# Patient Record
Sex: FEMALE | Race: WHITE | NOT HISPANIC OR LATINO | URBAN - METROPOLITAN AREA
[De-identification: names, ages, dates, MRNs, and addresses within clinical notes are randomized per-mention and may not be internally consistent; named-entity substitution may affect disease eponyms.]

---

## 2021-01-07 ENCOUNTER — OFFICE VISIT (OUTPATIENT)
Dept: URBAN - METROPOLITAN AREA CLINIC 118 | Facility: CLINIC | Age: 76
End: 2021-01-07
Payer: MEDICARE

## 2021-01-07 DIAGNOSIS — D63.8 ANEMIA OF CHRONIC DISEASE: ICD-10-CM

## 2021-01-07 DIAGNOSIS — R10.13 EPIGASTRIC DISCOMFORT: ICD-10-CM

## 2021-01-07 DIAGNOSIS — Z86.39 HISTORY OF IRON DEFICIENCY: ICD-10-CM

## 2021-01-07 PROCEDURE — 1036F TOBACCO NON-USER: CPT | Performed by: INTERNAL MEDICINE

## 2021-01-07 PROCEDURE — G8482 FLU IMMUNIZE ORDER/ADMIN: HCPCS | Performed by: INTERNAL MEDICINE

## 2021-01-07 PROCEDURE — G9903 PT SCRN TBCO ID AS NON USER: HCPCS | Performed by: INTERNAL MEDICINE

## 2021-01-07 PROCEDURE — 3017F COLORECTAL CA SCREEN DOC REV: CPT | Performed by: INTERNAL MEDICINE

## 2021-01-07 PROCEDURE — G8427 DOCREV CUR MEDS BY ELIG CLIN: HCPCS | Performed by: INTERNAL MEDICINE

## 2021-01-07 PROCEDURE — G8417 CALC BMI ABV UP PARAM F/U: HCPCS | Performed by: INTERNAL MEDICINE

## 2021-01-07 PROCEDURE — 99214 OFFICE O/P EST MOD 30 MIN: CPT | Performed by: INTERNAL MEDICINE

## 2021-01-07 RX ORDER — TORSEMIDE 20 MG/1
AS DIRECTED TABLET ORAL
Status: ACTIVE | COMMUNITY

## 2021-01-07 RX ORDER — HYDROXYUREA 500 MG/1
CAPSULE ORAL
Qty: 0 | Refills: 0 | Status: ACTIVE | COMMUNITY
Start: 1900-01-01

## 2021-01-07 RX ORDER — HYDRALAZINE HYDROCHLORIDE 50 MG/1
1 TABLET WITH FOOD TABLET, FILM COATED ORAL THREE TIMES A DAY
Status: ACTIVE | COMMUNITY

## 2021-01-07 RX ORDER — TRIAMTERENE AND HYDROCHLOROTHIAZIDE 75; 50 MG/1; MG/1
1 TABLET IN THE MORNING TABLET ORAL ONCE A DAY
Status: ACTIVE | COMMUNITY

## 2021-01-07 RX ORDER — MULTIVITAMIN WITH IRON
AS DIRECTED TABLET ORAL
Status: ACTIVE | COMMUNITY

## 2021-01-07 NOTE — HPI-TODAY'S VISIT:
The patient was last seen about a year ago for anemia.  She has a history of iron deficiency anemia, but her iron levels were normal at her hematologist in October.  However she continues to have severe anemia, possibly due to progression of her chronic kidney disease versus her underlying bone marrow disorder.  She has not been using iron pills and is tolerating aspirin 81 mg daily without any evidence of rectal bleeding.  She is on no new medications except for atorvastatin and Maxide.  Of note she has underlying CHF, and has lost about 20 pounds of fluid since starting the Maxide 2 weeks ago.  However starting at the same time she developed nausea and anorexia, and is unable to tolerate more than a minimal amount of food at any meal.  She thinks she is lost muscle mass as well as the fluid, but her breathing and chest pain are improved.

## 2021-01-08 PROBLEM — 427762006: Status: ACTIVE | Noted: 2021-01-07

## 2021-01-08 PROBLEM — 161449003: Status: ACTIVE | Noted: 2021-01-07

## 2021-01-08 LAB
BASO (ABSOLUTE): 0.1
BASOS: 1
BUN/CREATININE RATIO: 24
BUN: 78
CALCIUM: 9.4
CARBON DIOXIDE, TOTAL: 22
CHLORIDE: 98
CREATININE: 3.28
EGFR IF AFRICN AM: 15
EGFR IF NONAFRICN AM: 13
EOS (ABSOLUTE): 0.1
EOS: 1
GLUCOSE: 118
HEMATOCRIT: 27.7
HEMATOLOGY COMMENTS:: (no result)
HEMOGLOBIN: 9.3
IMMATURE CELLS: (no result)
IMMATURE GRANS (ABS): 0
IMMATURE GRANULOCYTES: 1
LYMPHS (ABSOLUTE): 0.4
LYMPHS: 5
MCH: 36.6
MCHC: 33.6
MCV: 109
MONOCYTES(ABSOLUTE): 0.8
MONOCYTES: 10
NEUTROPHILS (ABSOLUTE): 6.2
NEUTROPHILS: 82
NRBC: (no result)
PLATELETS: 508
POTASSIUM: 5.3
RBC: 2.54
RDW: 12.5
SODIUM: 135
WBC: 7.5

## 2021-07-07 ENCOUNTER — OFFICE VISIT (OUTPATIENT)
Dept: URBAN - METROPOLITAN AREA CLINIC 118 | Facility: CLINIC | Age: 76
End: 2021-07-07
Payer: MEDICARE

## 2021-07-07 DIAGNOSIS — R19.7 DIARRHEA OF PRESUMED INFECTIOUS ORIGIN: ICD-10-CM

## 2021-07-07 DIAGNOSIS — D63.8 ANEMIA OF CHRONIC DISEASE: ICD-10-CM

## 2021-07-07 DIAGNOSIS — R19.4 CHANGE IN BOWEL HABITS: ICD-10-CM

## 2021-07-07 DIAGNOSIS — K21.00 GASTROESOPHAGEAL REFLUX DISEASE WITH ESOPHAGITIS WITHOUT HEMORRHAGE: ICD-10-CM

## 2021-07-07 PROCEDURE — 99214 OFFICE O/P EST MOD 30 MIN: CPT | Performed by: INTERNAL MEDICINE

## 2021-07-07 RX ORDER — TRIAMTERENE AND HYDROCHLOROTHIAZIDE 75; 50 MG/1; MG/1
1 TABLET IN THE MORNING TABLET ORAL ONCE A DAY
Status: DISCONTINUED | COMMUNITY

## 2021-07-07 RX ORDER — MULTIVITAMIN WITH IRON
AS DIRECTED TABLET ORAL
Status: DISCONTINUED | COMMUNITY

## 2021-07-07 RX ORDER — HYDROXYUREA 500 MG/1
CAPSULE ORAL
Qty: 0 | Refills: 0 | Status: DISCONTINUED | COMMUNITY
Start: 1900-01-01

## 2021-07-07 RX ORDER — TORSEMIDE 20 MG/1
AS DIRECTED TABLET ORAL
Status: DISCONTINUED | COMMUNITY

## 2021-07-07 RX ORDER — HYDRALAZINE HYDROCHLORIDE 50 MG/1
1 TABLET WITH FOOD TABLET, FILM COATED ORAL THREE TIMES A DAY
Status: DISCONTINUED | COMMUNITY

## 2021-07-07 NOTE — HPI-TODAY'S VISIT:
The patient is following up after her visit in January.  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  She is receiving Procrit shots for her anemia but no chemotherapy at the present time.  She is no longer taking iron pills as her iron levels have normalized but she is continuing on oral B12.  She sees no blood with her bowel movements but does have irregular bowel habits frequently throughout the day.  Her regular bowel habits respond to Imodium therapy.  They have been like this for several years, but have worsened in the last several months with her polypharmacy as well as worsening myeloma and kidney disease.  There is no significant diarrhea or constipation.  She is continue on reflux medications with omeprazole 20 mg daily and has no significant burning, dysphagia, odynophagia, or hematemesis.

## 2021-07-09 PROBLEM — 234347009: Status: ACTIVE | Noted: 2021-01-07

## 2021-07-15 LAB
C DIFFICILE TOXIN GENE NAA: NEGATIVE
CAMPYLOBACTER CULTURE: (no result)
E COLI SHIGA TOXIN EIA: NEGATIVE
FATS, NEUTRAL: (no result)
FATS, TOTAL: (no result)
Lab: (no result)
Lab: (no result)
PANCREATIC ELASTASE, FECAL: 296
SALMONELLA/SHIGELLA SCREEN: (no result)

## 2021-10-06 ENCOUNTER — OFFICE VISIT (OUTPATIENT)
Dept: URBAN - METROPOLITAN AREA CLINIC 118 | Facility: CLINIC | Age: 76
End: 2021-10-06
Payer: MEDICARE

## 2021-10-06 ENCOUNTER — LAB OUTSIDE AN ENCOUNTER (OUTPATIENT)
Dept: URBAN - METROPOLITAN AREA CLINIC 118 | Facility: CLINIC | Age: 76
End: 2021-10-06

## 2021-10-06 VITALS
DIASTOLIC BLOOD PRESSURE: 76 MMHG | HEIGHT: 62 IN | HEART RATE: 65 BPM | WEIGHT: 154.2 LBS | TEMPERATURE: 98.1 F | BODY MASS INDEX: 28.37 KG/M2 | SYSTOLIC BLOOD PRESSURE: 184 MMHG

## 2021-10-06 DIAGNOSIS — R63.4 ABNORMAL LOSS OF WEIGHT: ICD-10-CM

## 2021-10-06 DIAGNOSIS — R10.84 GENERALIZED ABDOMINAL PAIN: ICD-10-CM

## 2021-10-06 DIAGNOSIS — D47.1 MYELOPROLIFERATIVE NEOPLASM: ICD-10-CM

## 2021-10-06 PROCEDURE — 99213 OFFICE O/P EST LOW 20 MIN: CPT | Performed by: INTERNAL MEDICINE

## 2021-10-06 NOTE — PHYSICAL EXAM CONSTITUTIONAL:
well developed, well nourished , in no acute distress , ambulating with cane , normal communication ability

## 2021-10-06 NOTE — HPI-TODAY'S VISIT:
The patient is following up after her visit in July.  She has had intermittent blood in her bowel movements as well as some bilateral lower quadrant pains.  Her weight is up 10 pounds, but she thinks this is due to abdominal swelling and she has lost muscle mass in her arms and legs.  Her weight is down over 25 pounds since last year.  She is on no new medications.  The pain in the bilateral lower quadrants does not radiate and she has no vomiting.  The blood in her bowel movements is described as intermittent and bright red blood.  Of note at her colonoscopy in 2019 she was noted to have hemorrhoids.  She is on aspirin once a day but no other blood thinners.  The abdominal pain is associated with the bowel movements but not with eating.

## 2021-10-06 NOTE — PHYSICAL EXAM GASTROINTESTINAL
Abdomen , soft, tender BLQ but no guard, nondistended , no guarding or rigidity , no masses palpable , normal bowel sounds , soft tissue masses versus stool in lower quadrants Liver and Spleen , palpable liver not spleen

## 2021-10-07 ENCOUNTER — DASHBOARD ENCOUNTERS (OUTPATIENT)
Age: 76
End: 2021-10-07

## 2021-11-11 ENCOUNTER — TELEPHONE ENCOUNTER (OUTPATIENT)
Dept: URBAN - METROPOLITAN AREA CLINIC 6 | Facility: CLINIC | Age: 76
End: 2021-11-11

## 2024-08-22 ENCOUNTER — OFFICE VISIT (OUTPATIENT)
Dept: URBAN - METROPOLITAN AREA CLINIC 118 | Facility: CLINIC | Age: 79
End: 2024-08-22
Payer: MEDICARE

## 2024-08-22 VITALS
WEIGHT: 148 LBS | HEART RATE: 76 BPM | TEMPERATURE: 98.4 F | BODY MASS INDEX: 29.06 KG/M2 | HEIGHT: 60 IN | DIASTOLIC BLOOD PRESSURE: 56 MMHG | SYSTOLIC BLOOD PRESSURE: 146 MMHG

## 2024-08-22 DIAGNOSIS — Z99.2 DEPENDENCE ON RENAL DIALYSIS: ICD-10-CM

## 2024-08-22 DIAGNOSIS — D46.9 MYELODYSPLASIA (MYELODYSPLASTIC SYNDROME): ICD-10-CM

## 2024-08-22 DIAGNOSIS — Z86.010 PERSONAL HISTORY OF COLONIC POLYPS: ICD-10-CM

## 2024-08-22 DIAGNOSIS — R13.12 OROPHARYNGEAL DYSPHAGIA: ICD-10-CM

## 2024-08-22 DIAGNOSIS — N18.6 END STAGE RENAL DISEASE: ICD-10-CM

## 2024-08-22 PROCEDURE — 99214 OFFICE O/P EST MOD 30 MIN: CPT | Performed by: INTERNAL MEDICINE

## 2024-08-22 RX ORDER — LEVOTHYROXINE SODIUM 200 UG/1
TAKE ONE TABLET BY MOUTH EVERY MORNING ON AN EMPTY STOMACH TABLET ORAL
Qty: 90 UNSPECIFIED | Refills: 0 | Status: ACTIVE | COMMUNITY

## 2024-08-22 RX ORDER — NITROGLYCERIN 0.4 MG/1
AS DIRECTED TABLET SUBLINGUAL
Status: ACTIVE | COMMUNITY
Start: 2024-08-22

## 2024-08-22 RX ORDER — SERTRALINE HYDROCHLORIDE 100 MG/1
TAKE TWO TABLETS BY MOUTH ONE TIME DAILY TABLET, FILM COATED ORAL
Qty: 180 UNSPECIFIED | Refills: 0 | Status: ACTIVE | COMMUNITY

## 2024-08-22 RX ORDER — ATORVASTATIN CALCIUM 20 MG/1
TAKE ONE TABLET BY MOUTH ONE TIME DAILY TABLET, FILM COATED ORAL
Qty: 90 UNSPECIFIED | Refills: 0 | Status: ACTIVE | COMMUNITY

## 2024-08-22 RX ORDER — ASPIRIN 81 MG/1
1 TABLET TABLET, COATED ORAL ONCE A DAY
Qty: 30 | Status: ACTIVE | COMMUNITY
Start: 2024-08-22

## 2024-08-22 RX ORDER — ATENOLOL 25 MG/1
TAKE ONE TABLET BY MOUTH ONE TIME DAILY TABLET ORAL
Qty: 90 UNSPECIFIED | Refills: 1 | Status: ACTIVE | COMMUNITY

## 2024-08-22 RX ORDER — DENOSUMAB 60 MG/ML
AS DIRECTED INJECTION SUBCUTANEOUS
Status: ACTIVE | COMMUNITY
Start: 2024-08-22

## 2024-08-22 RX ORDER — CHOLECALCIFEROL (VITAMIN D3) 50 MCG
1 CAPSULE CAPSULE ORAL ONCE A DAY
Qty: 30 | Status: ACTIVE | COMMUNITY
Start: 2024-08-22 | End: 2024-09-21

## 2024-08-22 RX ORDER — METHOXY POLYETHYLENE GLYCOL-EPOETIN BETA 30 UG/.3ML
AS DIRECTED INJECTION, SOLUTION INTRAVENOUS
Status: ACTIVE | COMMUNITY
Start: 2024-08-22

## 2024-08-22 NOTE — HPI-TODAY'S VISIT:
The patient is seen in follow-up for senescent dysphagia of the oropharynx.  This is present for over a year.  It is primarily for pills but cannot happen with meals as well.  She has had no impaction or aspiration pneumonia.  There is no lower esophageal dysphagia.  This has begun ever since her health has declined over the last 2 years with new dialysis.  A recent PET scan showed no neck or esophageal lesions.  In addition there were no lesions seen in the colon although she was due for a colonoscopy this year.  We discussed watchful waiting given her dialysis, myelodysplasia, and severe deconditioning and she would prefer not to proceed with an upper or lower endoscopy at the present time.

## 2024-08-22 NOTE — PHYSICAL EXAM CHEST:
no lesions, no deformities, no traumatic injuries, no significant scars are present, chest wall non-tender, no masses present, breathing is unlabored without accessory muscle use,normal breath sounds , VASCath L chest wall

## 2024-08-24 PROBLEM — 428982002: Status: ACTIVE | Noted: 2024-08-24

## 2024-08-24 PROBLEM — 105502003: Status: ACTIVE | Noted: 2024-08-24

## 2024-08-24 PROBLEM — 428283002: Status: ACTIVE | Noted: 2024-08-24

## 2024-08-24 PROBLEM — 109995007: Status: ACTIVE | Noted: 2024-08-24

## 2024-08-24 PROBLEM — 71457002: Status: ACTIVE | Noted: 2024-08-24

## 2025-02-14 ENCOUNTER — TELEPHONE ENCOUNTER (OUTPATIENT)
Dept: URBAN - METROPOLITAN AREA CLINIC 118 | Facility: CLINIC | Age: 80
End: 2025-02-14

## 2025-02-18 ENCOUNTER — LAB OUTSIDE AN ENCOUNTER (OUTPATIENT)
Dept: URBAN - METROPOLITAN AREA CLINIC 118 | Facility: CLINIC | Age: 80
End: 2025-02-18

## 2025-02-18 ENCOUNTER — OFFICE VISIT (OUTPATIENT)
Dept: URBAN - METROPOLITAN AREA CLINIC 118 | Facility: CLINIC | Age: 80
End: 2025-02-18
Payer: MEDICARE

## 2025-02-18 VITALS
HEART RATE: 71 BPM | WEIGHT: 144 LBS | TEMPERATURE: 98.4 F | HEIGHT: 60 IN | DIASTOLIC BLOOD PRESSURE: 49 MMHG | BODY MASS INDEX: 28.27 KG/M2 | SYSTOLIC BLOOD PRESSURE: 107 MMHG

## 2025-02-18 DIAGNOSIS — Z99.2 DEPENDENCE ON RENAL DIALYSIS: ICD-10-CM

## 2025-02-18 DIAGNOSIS — R11.0 NAUSEA: ICD-10-CM

## 2025-02-18 DIAGNOSIS — R19.00 ABDOMINAL SWELLING: ICD-10-CM

## 2025-02-18 DIAGNOSIS — Z86.0100 HISTORY OF COLON POLYPS: ICD-10-CM

## 2025-02-18 DIAGNOSIS — R13.12 OROPHARYNGEAL DYSPHAGIA: ICD-10-CM

## 2025-02-18 DIAGNOSIS — N18.6 END STAGE RENAL DISEASE: ICD-10-CM

## 2025-02-18 DIAGNOSIS — D46.9 MYELODYSPLASIA (MYELODYSPLASTIC SYNDROME): ICD-10-CM

## 2025-02-18 PROCEDURE — 99214 OFFICE O/P EST MOD 30 MIN: CPT

## 2025-02-18 RX ORDER — ASPIRIN 81 MG/1
1 TABLET TABLET, COATED ORAL ONCE A DAY
Qty: 30 | Status: ACTIVE | COMMUNITY
Start: 2024-08-22

## 2025-02-18 RX ORDER — LEVOTHYROXINE SODIUM 200 UG/1
TAKE ONE TABLET BY MOUTH EVERY MORNING ON AN EMPTY STOMACH TABLET ORAL
Qty: 90 UNSPECIFIED | Refills: 0 | Status: ACTIVE | COMMUNITY

## 2025-02-18 RX ORDER — SERTRALINE HYDROCHLORIDE 100 MG/1
TAKE TWO TABLETS BY MOUTH ONE TIME DAILY TABLET, FILM COATED ORAL
Qty: 180 UNSPECIFIED | Refills: 0 | Status: ACTIVE | COMMUNITY

## 2025-02-18 RX ORDER — NITROGLYCERIN 0.4 MG/1
AS DIRECTED TABLET SUBLINGUAL
Status: ACTIVE | COMMUNITY
Start: 2024-08-22

## 2025-02-18 RX ORDER — METHOXY POLYETHYLENE GLYCOL-EPOETIN BETA 30 UG/.3ML
AS DIRECTED INJECTION, SOLUTION INTRAVENOUS
Status: ACTIVE | COMMUNITY
Start: 2024-08-22

## 2025-02-18 RX ORDER — ATORVASTATIN CALCIUM 20 MG/1
TAKE ONE TABLET BY MOUTH ONE TIME DAILY TABLET, FILM COATED ORAL
Qty: 90 UNSPECIFIED | Refills: 0 | Status: ACTIVE | COMMUNITY

## 2025-02-18 RX ORDER — ATENOLOL 25 MG/1
TAKE ONE TABLET BY MOUTH ONE TIME DAILY TABLET ORAL
Qty: 90 UNSPECIFIED | Refills: 1 | Status: ACTIVE | COMMUNITY

## 2025-02-18 RX ORDER — DENOSUMAB 60 MG/ML
AS DIRECTED INJECTION SUBCUTANEOUS
Status: ACTIVE | COMMUNITY
Start: 2024-08-22

## 2025-02-18 NOTE — HPI-TODAY'S VISIT:
Ms. Soriano is a 78 y/o F with PMHx of myelodysplastic syndrome, ELIJAH, HTN, hypothyroidism, ESRD on hemodialysis, CHF, and paroxysmal A. fib presents to the office with abdominal swelling. Spouse present with patient in office today.  Patient states that her nurse at the dialysis center has noticed an increase in abdominal swelling the past couple months. Patient reports that she has noticed a decrease in appetite and increase in nausea that started around the same time. Patient states that she previously would feel nauseated in the morning, but now feels nauseated before and after meals as well. She has lost ~3# during this period. She denies vomiting, reflux/heartburn, dysphagia or abdominal pain. Patient reports having bowel movements 3x/daily with soft stools but no recent changes in bowel movements, diarrhea, or hematochezia. Her last imaging of her abdomen was at PET/CT 8/2024 due to her hx of myelodysplastic syndrome. Of note, after she initially started HD, she did have issues with fluid collecting in the lungs and had to undergo thoracentesis.

## 2025-02-19 PROBLEM — 60728008: Status: ACTIVE | Noted: 2025-02-19

## 2025-02-21 LAB — RESULT:: (no result)

## 2025-05-13 ENCOUNTER — OFFICE VISIT (OUTPATIENT)
Dept: URBAN - METROPOLITAN AREA CLINIC 118 | Facility: CLINIC | Age: 80
End: 2025-05-13
Payer: MEDICARE

## 2025-05-13 ENCOUNTER — LAB OUTSIDE AN ENCOUNTER (OUTPATIENT)
Dept: URBAN - METROPOLITAN AREA CLINIC 118 | Facility: CLINIC | Age: 80
End: 2025-05-13

## 2025-05-13 DIAGNOSIS — K59.04 CHRONIC IDIOPATHIC CONSTIPATION: ICD-10-CM

## 2025-05-13 DIAGNOSIS — R14.0 POSTPRANDIAL BLOATING: ICD-10-CM

## 2025-05-13 DIAGNOSIS — R11.0 CHRONIC NAUSEA: ICD-10-CM

## 2025-05-13 DIAGNOSIS — K62.3 RECTAL PROLAPSE: ICD-10-CM

## 2025-05-13 PROBLEM — 82934008: Status: ACTIVE | Noted: 2025-05-13

## 2025-05-13 PROCEDURE — 99213 OFFICE O/P EST LOW 20 MIN: CPT | Performed by: INTERNAL MEDICINE

## 2025-05-13 RX ORDER — ASPIRIN 81 MG/1
1 TABLET TABLET, COATED ORAL ONCE A DAY
Qty: 30 | Status: ACTIVE | COMMUNITY
Start: 2024-08-22

## 2025-05-13 RX ORDER — METHOXY POLYETHYLENE GLYCOL-EPOETIN BETA 30 UG/.3ML
AS DIRECTED INJECTION, SOLUTION INTRAVENOUS
Status: ACTIVE | COMMUNITY
Start: 2024-08-22

## 2025-05-13 RX ORDER — NITROGLYCERIN 0.4 MG/1
AS DIRECTED TABLET SUBLINGUAL
Status: ACTIVE | COMMUNITY
Start: 2024-08-22

## 2025-05-13 RX ORDER — ATENOLOL 25 MG/1
TAKE ONE TABLET BY MOUTH ONE TIME DAILY TABLET ORAL
Qty: 90 UNSPECIFIED | Refills: 1 | Status: ACTIVE | COMMUNITY

## 2025-05-13 RX ORDER — ATORVASTATIN CALCIUM 20 MG/1
TAKE ONE TABLET BY MOUTH ONE TIME DAILY TABLET, FILM COATED ORAL
Qty: 90 UNSPECIFIED | Refills: 0 | Status: ACTIVE | COMMUNITY

## 2025-05-13 RX ORDER — SERTRALINE HYDROCHLORIDE 100 MG/1
TAKE TWO TABLETS BY MOUTH ONE TIME DAILY TABLET, FILM COATED ORAL
Qty: 180 UNSPECIFIED | Refills: 0 | Status: ACTIVE | COMMUNITY

## 2025-05-13 RX ORDER — LEVOTHYROXINE SODIUM 200 UG/1
TAKE ONE TABLET BY MOUTH EVERY MORNING ON AN EMPTY STOMACH TABLET ORAL
Qty: 90 UNSPECIFIED | Refills: 0 | Status: ACTIVE | COMMUNITY

## 2025-05-13 RX ORDER — DENOSUMAB 60 MG/ML
AS DIRECTED INJECTION SUBCUTANEOUS
Status: ACTIVE | COMMUNITY
Start: 2024-08-22

## 2025-05-13 NOTE — HPI-TODAY'S VISIT:
The patient is following up after recent CT scan following office visit and February.  There was no evidence of ascites or abdominal mass but she was noted to have a dilated rectum with significant stool present, splenomegaly from her myelodysplasia, and diverticulosis.  In addition she has an L1 compression fracture.  She is taking Senokot once daily and has multiple small bowel movements per day.  There is no significant blood in the stool.  Her dialysis has been stable 3 times weekly without recent change.  She has no severe abdominal pain but does have mild nausea and postprandial discomfort in the epigastric region.  The gallbladder appeared normal by CT scan.  She has not had her gallbladder removed.

## 2025-05-13 NOTE — PHYSICAL EXAM CHEST:
chest wall non-tender, breathing is unlabored without accessory muscle use, normal breath sounds car